# Patient Record
Sex: FEMALE | Race: WHITE | URBAN - METROPOLITAN AREA
[De-identification: names, ages, dates, MRNs, and addresses within clinical notes are randomized per-mention and may not be internally consistent; named-entity substitution may affect disease eponyms.]

---

## 2017-04-04 ENCOUNTER — TELEPHONE (OUTPATIENT)
Dept: OBGYN CLINIC | Age: 37
End: 2017-04-04

## 2017-04-04 RX ORDER — ETONOGESTREL AND ETHINYL ESTRADIOL 11.7; 2.7 MG/1; MG/1
INSERT, EXTENDED RELEASE VAGINAL
Qty: 3 DEVICE | Refills: 0 | Status: SHIPPED | OUTPATIENT
Start: 2017-04-04 | End: 2017-06-01 | Stop reason: SDUPTHER

## 2017-04-04 NOTE — TELEPHONE ENCOUNTER
31year old patient last seen in the office on 3/8/16. Patient had to be rescheduled for appointment now on 4/25/17. Prescription for birth control sent as per MD order to patient preferred pharmacy.

## 2017-06-01 ENCOUNTER — OFFICE VISIT (OUTPATIENT)
Dept: OBGYN CLINIC | Age: 37
End: 2017-06-01

## 2017-06-01 VITALS
BODY MASS INDEX: 23.21 KG/M2 | SYSTOLIC BLOOD PRESSURE: 114 MMHG | DIASTOLIC BLOOD PRESSURE: 74 MMHG | WEIGHT: 131 LBS | HEIGHT: 63 IN

## 2017-06-01 DIAGNOSIS — D06.9 CIN III (CERVICAL INTRAEPITHELIAL NEOPLASIA GRADE III) WITH SEVERE DYSPLASIA: ICD-10-CM

## 2017-06-01 DIAGNOSIS — Z01.419 ENCOUNTER FOR GYNECOLOGICAL EXAMINATION (GENERAL) (ROUTINE) WITHOUT ABNORMAL FINDINGS: Primary | ICD-10-CM

## 2017-06-01 DIAGNOSIS — Z87.42 HISTORY OF ABNORMAL CERVICAL PAPANICOLAOU SMEAR: ICD-10-CM

## 2017-06-01 RX ORDER — ETONOGESTREL AND ETHINYL ESTRADIOL 11.7; 2.7 MG/1; MG/1
INSERT, EXTENDED RELEASE VAGINAL
Qty: 3 DEVICE | Refills: 4 | Status: SHIPPED | OUTPATIENT
Start: 2017-06-01 | End: 2018-03-09 | Stop reason: SDUPTHER

## 2017-06-01 NOTE — MR AVS SNAPSHOT
Visit Information Date & Time Provider Department Dept. Phone Encounter #  
 6/1/2017  7:40 AM Haja Hernandez MD [facility] [id number] Upcoming Health Maintenance Date Due INFLUENZA AGE 9 TO ADULT 8/1/2017 PAP AKA CERVICAL CYTOLOGY 2/6/2019 Allergies as of 6/1/2017  Review Complete On: 6/1/2017 By: Jose Montero LPN No Known Allergies Current Immunizations  Never Reviewed No immunizations on file. Not reviewed this visit Vitals BP Height(growth percentile) Weight(growth percentile) LMP BMI OB Status 114/74 5' 3\" (1.6 m) 131 lb (59.4 kg) 05/20/2017 (Within Days) 23.21 kg/m2 Having regular periods Smoking Status Former Smoker BMI and BSA Data Body Mass Index Body Surface Area  
 23.21 kg/m 2 1.62 m 2 Preferred Pharmacy Pharmacy Name Phone [facility], [address] [address] [place] [address] [address] [facility] Your Updated Medication List  
  
   
This list is accurate as of: 6/1/17  8:16 AM.  Always use your most recent med list.  
  
  
  
  
 ethinyl estradiol-etonogestrel 0.12-0.015 mg/24 hr vaginal ring Commonly known as:  Waqas Vaca Insert vaginally, leave in for three weeks, remove for one week  
  
 lidocaine 2 % solution Commonly known as:  XYLOCAINE Take 15 mL by mouth as needed for Pain. penicillin v potassium 500 mg tablet Commonly known as:  VEETID  
  
 valACYclovir 1 gram tablet Commonly known as:  VALTREX Take 2 tablets by mouth two times daily for 1 day as  needed Patient Instructions Well Visit, Ages 25 to 48: Care Instructions Your Care Instructions Physical exams can help you stay healthy. Your doctor has checked your overall health and may have suggested ways to take good care of yourself. He or she also may have recommended tests.  At home, you can help prevent illness with healthy eating, regular exercise, and other steps. Follow-up care is a key part of your treatment and safety. Be sure to make and go to all appointments, and call your doctor if you are having problems. It's also a good idea to know your test results and keep a list of the medicines you take. How can you care for yourself at home? · Reach and stay at a healthy weight. This will lower your risk for many problems, such as obesity, diabetes, heart disease, and high blood pressure. · Get at least 30 minutes of physical activity on most days of the week. Walking is a good choice. You also may want to do other activities, such as running, swimming, cycling, or playing tennis or team sports. Discuss any changes in your exercise program with your doctor. · Do not smoke or allow others to smoke around you. If you need help quitting, talk to your doctor about stop-smoking programs and medicines. These can increase your chances of quitting for good. · Talk to your doctor about whether you have any risk factors for sexually transmitted infections (STIs). Having one sex partner (who does not have STIs and does not have sex with anyone else) is a good way to avoid these infections. · Use birth control if you do not want to have children at this time. Talk with your doctor about the choices available and what might be best for you. · Protect your skin from too much sun. When you're outdoors from 10 a.m. to 4 p.m., stay in the shade or cover up with clothing and a hat with a wide brim. Wear sunglasses that block UV rays. Even when it's cloudy, put broad-spectrum sunscreen (SPF 30 or higher) on any exposed skin. · See a dentist one or two times a year for checkups and to have your teeth cleaned. · Wear a seat belt in the car. · Drink alcohol in moderation, if at all. That means no more than 2 drinks a day for men and 1 drink a day for women. Follow your doctor's advice about when to have certain tests. These tests can spot problems early. For everyone · Cholesterol. Have the fat (cholesterol) in your blood tested after age 21. Your doctor will tell you how often to have this done based on your age, family history, or other things that can increase your risk for heart disease. · Blood pressure. Have your blood pressure checked during a routine doctor visit. Your doctor will tell you how often to check your blood pressure based on your age, your blood pressure results, and other factors. · Vision. Talk with your doctor about how often to have a glaucoma test. 
· Diabetes. Ask your doctor whether you should have tests for diabetes. · Colon cancer. Have a test for colon cancer at age 48. You may have one of several tests. If you are younger than 48, you may need a test earlier if you have any risk factors. Risk factors include whether you already had a precancerous polyp removed from your colon or whether your parent, brother, sister, or child has had colon cancer. For women · Breast exam and mammogram. Talk to your doctor about when you should have a clinical breast exam and a mammogram. Medical experts differ on whether and how often women under 50 should have these tests. Your doctor can help you decide what is right for you. · Pap test and pelvic exam. Begin Pap tests at age 24. A Pap test is the best way to find cervical cancer. The test often is part of a pelvic exam. Ask how often to have this test. 
· Tests for sexually transmitted infections (STIs). Ask whether you should have tests for STIs. You may be at risk if you have sex with more than one person, especially if your partners do not wear condoms. For men · Tests for sexually transmitted infections (STIs). Ask whether you should have tests for STIs. You may be at risk if you have sex with more than one person, especially if you do not wear a condom. · Testicular cancer exam. Ask your doctor whether you should check your testicles regularly. · Prostate exam. Talk to your doctor about whether you should have a blood test (called a PSA test) for prostate cancer. Experts differ on whether and when men should have this test. Some experts suggest it if you are older than 39 and are -American or have a father or brother who got prostate cancer when he was younger than 72. When should you call for help? Watch closely for changes in your health, and be sure to contact your doctor if you have any problems or symptoms that concern you. Where can you learn more? Go to http://hussein-arabella.info/. Enter P072 in the search box to learn more about \"Well Visit, Ages 25 to 48: Care Instructions. \" Current as of: July 19, 2016 Content Version: 11.2 © 9202-6994 [employer]. Care instructions adapted under license by [employer] (which disclaims liability or warranty for this information). If you have questions about a medical condition or this instruction, always ask your healthcare professional. [place] [address] any warranty or liability for your use of this information. Introducing [facility] & HEALTH SERVICES! Dear Jmaie Yancey: Thank you for requesting a Fashiont[employer] account. Our records indicate that you have previously registered for a Fashiont[employer] account but its currently inactive. Please call our Fashiont[employer] support line at [phone]. Additional Information If you have questions, please visit the Frequently Asked Questions section of the Fashion[employer] website at [email or web address]. [employer]. [employer]/. Remember, Fashion[employer] is NOT to be used for urgent needs. For medical emergencies, dial 911. Now available from your iPhone and Android! Please provide this summary of care documentation to your next provider. Your primary care clinician is listed as Rafat Burk.  If you have any questions after today's visit, please call [phone].

## 2017-06-01 NOTE — PATIENT INSTRUCTIONS

## 2017-06-01 NOTE — PROGRESS NOTES
[facility]  [email or web address]  [phone]    Efrain Dorsey MD, FACOG       Annual Gynecologic Exam:  WWE <40  Chief Complaint   Patient presents with    Well Woman         Enid Woodson is a 31 y.o.  WHITE OR  female who presents for an annual well woman exam.  Patient's last menstrual period was 2017 (within days). .    With regard to the Gardisil vaccine, she is older than the FDA approved age to receive it. She does not report additional concerns today. Menstrual status:  Her periods are moderate. She does not report dysmenorrhea/painful menses. She does not report irregular bleeding. Sexual history and Contraception:  History   Sexual Activity    Sexual activity: Yes    Partners: Male    Birth control/ protection: Inserts     Comment: Nuvaring     She sometimes use condoms with sexual activity  She does reports new sexual partner(s) in the last year. The patient does not request STD testing. We recommended testing per CDC guidelines and at patient request.     Preventive Medicine History:  Her last annual GYN exam was about one year ago. Her most recent Pap smear result: normal was obtained in 2015. Her most recent HR HPV screen was not obtained in , but was negative . She does have a history of PATRICIA 2, 3 or cervical cancer.      Past Medical History:   Diagnosis Date    PATRICIA III (cervical intraepithelial neoplasia grade III) with severe dysplasia     Depression     History of seasonal allergies     Hx of abnormal Pap smear 4/28/10    Oral herpes simplex infection     Other specified viral infection, in conditions classified elsewhere and of unspecified site     Pap smear for cervical cancer screening 4/28/10,14-LGSIL; - Negative, HPV negative    Pap smear for cervical cancer screening 3/5/15    normal     OB History    Para Term  AB SAB TAB Ectopic Multiple Living   2 2 2 0 0 0 0 0 0 2      # Outcome Date GA Lbr Neto/2nd Weight Sex Delivery Anes PTL Lv   2 Term            1 Term                 Past Surgical History:   Procedure Laterality Date    HX BREAST AUGMENTATION  2015    b/l    HX COLPOSCOPY  6/9/10    PATRICIA III    HX LEEP PROCEDURE  6/21/10    in office      Family History   Problem Relation Age of Onset    Thyroid Disease Mother     Hypertension Father     Diabetes Maternal Grandfather      Social History     Social History    Marital status: SINGLE     Spouse name: N/A    Number of children: N/A    Years of education: N/A     Occupational History    Not on file. Social History Main Topics    Smoking status: Former Smoker     Quit date: 7/5/2011    Smokeless tobacco: Never Used    Alcohol use No    Drug use: Not on file    Sexual activity: Yes     Partners: Male     Birth control/ protection: Inserts      Comment: Nuvaring     Other Topics Concern    Not on file     Social History Narrative       No Known Allergies    Current Outpatient Prescriptions   Medication Sig    ethinyl estradiol-etonogestrel (NUVARING) 0.12-0.015 mg/24 hr vaginal ring Insert vaginally, leave in for three weeks, remove for one week    valACYclovir (VALTREX) 1 gram tablet Take 2 tablets by mouth two times daily for 1 day as  needed     No current facility-administered medications for this visit.         Patient Active Problem List   Diagnosis Code    History of abnormal cervical Papanicolaou smear Z87.898    Other specified viral infection, in conditions classified elsewhere and of unspecified site B97.89    Vaginitis N76.0       Review of Systems - History obtained from the patient  Constitutional: negative for weight loss, fever, night sweats  HEENT: negative for hearing loss, earache, congestion, snoring, sorethroat  CV: negative for chest pain, palpitations, edema  Resp: negative for cough, shortness of breath, wheezing  GI: negative for change in bowel habits, abdominal pain, black or bloody stools  : negative for frequency, dysuria, hematuria  GYN: see HPI  MSK: negative for back pain, joint pain, muscle pain  Breast: negative for breast lumps, nipple discharge, galactorrhea  Skin :negative for itching, rash, hives  Neuro: negative for dizziness, headache, confusion, weakness  Psych: negative for anxiety, depression, change in mood  Heme/lymph: negative for bleeding, bruising, pallor    Physical Exam  Visit Vitals    /74    Ht 5' 3\" (1.6 m)    Wt 131 lb (59.4 kg)    LMP 05/20/2017 (Within Days)    BMI 23.21 kg/m2       Constitutional  · Appearance: well-nourished, well developed, alert, in no acute distress    HENT  · Head and Face: appears normal    Neck  · Inspection/Palpation: normal appearance, no masses or tenderness  · Lymph Nodes: no lymphadenopathy present  · Thyroid: gland size normal, nontender, no nodules or masses present on palpation    Chest  · Respiratory Effort: breathing labored  · Auscultation: normal breath sounds    Cardiovascular  · Heart:  · Auscultation: regular rate and rhythm without murmur    Breasts  · Inspection of Breasts: breasts symmetrical, no skin changes, no discharge present, nipple appearance normal, no skin retraction present  · Palpation of Breasts and Axillae: no masses present on palpation, no breast tenderness  · Axillary Lymph Nodes: no lymphadenopathy present    Gastrointestinal  · Abdominal Examination: abdomen non-tender to palpation, normal bowel sounds, no masses present  · Liver and spleen: no hepatomegaly present, spleen not palpable  · Hernias: no hernias identified    Genitourinary  · External Genitalia: normal appearance for age, no discharge present, no tenderness present, no inflammatory lesions present, no masses present  · Vagina: normal vaginal vault without central or paravaginal defects, no discharge present, no inflammatory lesions present, no masses present  · Bladder: non-tender to palpation  · Urethra: appears normal  · Cervix: normal   · Uterus: normal size, shape and consistency  · Adnexa: no adnexal tenderness present, no adnexal masses present  · Perineum: perineum within normal limits, no evidence of trauma, no rashes or skin lesions present  · Anus: anus within normal limits, no hemorrhoids present  · Inguinal Lymph Nodes: no lymphadenopathy present    Skin  · General Inspection: no rash, no lesions identified    Neurologic/Psychiatric  · Mental Status:  · Orientation: grossly oriented to person, place and time  · Mood and Affect: mood normal, affect appropriate    Assessment:  31 y.o.  for well woman exam  Encounter Diagnoses   Name Primary?  History of abnormal cervical Papanicolaou smear     Encounter for gynecological examination (general) (routine) without abnormal findings Yes    PATRICIA III (cervical intraepithelial neoplasia grade III) with severe dysplasia        Plan:  The patient was counseled about diet, exercise, healthy lifestyle  We discussed self breast exam  We discussed safer sex practices, condom use and risk factors for sexually transmitted diseases. We discussed current pap smear and HR HPV testing guidelines. We recommend follow up one year for routine annual gynecologic exam or sooner prn  We recommend routine follow up with her primary care doctor for management of chronic medical problems and non-gynecologic concerns  Handouts were given to the patient  We discussed calcium/vitamin D/weight bearing exercise and osteoporosis prevention  Discussed risks, benefits and alternatives of OCP: including but not limited to dvt/pe/mi/cva/ca/gi risks. We discussed progesterone only and non hormonal options for contraception including but not limited to condoms, IUDs, Nexplanon, and depo provera. Pt wants to continue nuvaring for now.     Folllow up:  [x] return for annual well woman exam in one year or sooner if she is having problems  [] follow up and ultrasound  [] 6 months  [] 3 months  [] 6 weeks   [] 1 month    Orders Placed This Encounter    ethinyl estradiol-etonogestrel (NUVARING) 0.12-0.015 mg/24 hr vaginal ring    PAP, IG, RFX HPV ASCUS ([id number])       No results found for any visits on 06/01/17.

## 2017-07-13 LAB
CYTOLOGIST CVX/VAG CYTO: NORMAL
CYTOLOGY CVX/VAG DOC THIN PREP: NORMAL
CYTOLOGY HISTORY:: NORMAL
DX ICD CODE: NORMAL
LABCORP, 190119: NORMAL
Lab: NORMAL
OTHER STN SPEC: NORMAL
PATH REPORT.FINAL DX SPEC: NORMAL
STAT OF ADQ CVX/VAG CYTO-IMP: NORMAL

## 2017-09-13 RX ORDER — VALACYCLOVIR HYDROCHLORIDE 1 G/1
TABLET, FILM COATED ORAL
Qty: 12 TAB | Refills: 0 | Status: SHIPPED | OUTPATIENT
Start: 2017-09-13 | End: 2018-03-09 | Stop reason: SDUPTHER

## 2017-09-13 NOTE — TELEPHONE ENCOUNTER
Patient last seen for AE on 06/01/17 and called today, left VM on triage line stating that she needs a refill of her valtrex. Please advise.

## 2017-09-13 NOTE — TELEPHONE ENCOUNTER
AE up to date. rx sent to pharmacy of choice. attempted to contact patient, phone services are not in service.

## 2017-09-15 ENCOUNTER — TELEPHONE (OUTPATIENT)
Dept: OBGYN CLINIC | Age: 37
End: 2017-09-15

## 2017-09-15 RX ORDER — ETONOGESTREL AND ETHINYL ESTRADIOL 11.7; 2.7 MG/1; MG/1
INSERT, EXTENDED RELEASE VAGINAL
Qty: 1 DEVICE | Refills: 0 | Status: SHIPPED | OUTPATIENT
Start: 2017-09-15 | End: 2018-06-12 | Stop reason: SDUPTHER

## 2017-09-15 NOTE — TELEPHONE ENCOUNTER
Message left at 3:16PM      30year old patient last seen in the office on 6/1/17 for AE. Prescription for birth control sent on 6/1/17 for patients mail order pharmacy . Patient calling to say that she is due to go out of town and needs an emergency prescription for one ring to be sent to her local pharmacy. Prescription sent as per MD order to patient preferred pharmacy. Patient advised to contact her mail order pharmacy when [place] from [place] the [place]. Patient verbalized understanding.

## 2017-09-28 ENCOUNTER — OFFICE VISIT (OUTPATIENT)
Dept: FAMILY MEDICINE CLINIC | Age: 37
End: 2017-09-28

## 2017-09-28 VITALS
HEIGHT: 63 IN | OXYGEN SATURATION: 99 % | RESPIRATION RATE: 16 BRPM | DIASTOLIC BLOOD PRESSURE: 90 MMHG | WEIGHT: 132 LBS | TEMPERATURE: 98.6 F | SYSTOLIC BLOOD PRESSURE: 132 MMHG | HEART RATE: 102 BPM | BODY MASS INDEX: 23.39 KG/M2

## 2017-09-28 DIAGNOSIS — J02.0 STREP PHARYNGITIS: Primary | ICD-10-CM

## 2017-09-28 LAB
S PYO AG THROAT QL: POSITIVE
VALID INTERNAL CONTROL?: YES

## 2017-09-28 RX ORDER — AMOXICILLIN 875 MG/1
875 TABLET, FILM COATED ORAL 2 TIMES DAILY
Qty: 20 TAB | Refills: 0 | Status: SHIPPED | OUTPATIENT
Start: 2017-09-28 | End: 2017-10-08

## 2017-09-28 NOTE — PROGRESS NOTES
Chief Complaint   Patient presents with    Sore Throat     Seeing spots on back of throat, glands feel swollen and started this am. Sinus stuff for the past couple of days

## 2017-09-28 NOTE — PROGRESS NOTES
Subjective:   Laya Pace is a 30 y.o. female who complains of sore throat and not feeling well for 2-3 days, gradually worsening since that time. Pt's daughter had strep 2 weeks ago. She just returned from a 10 day vacation to [place]. Started having drainage and sore throat a couple days ago, attributed to allergies. Sore throat has worsened and saw \"white spots\" on tonsils today. She denies a history of chills, fevers, shortness of breath, vomiting, wheezing and cough. Evaluation to date: none. Treatment to date: OTC products. Patient does not smoke cigarettes. Relevant PMH:   Past Medical History:   Diagnosis Date    PATRICIA III (cervical intraepithelial neoplasia grade III) with severe dysplasia     Depression     History of seasonal allergies     Hx of abnormal Pap smear 4/28/10    Oral herpes simplex infection     Other specified viral infection, in conditions classified elsewhere and of unspecified site     Pap smear for cervical cancer screening 4/28/10,2/6/14 2010-LGSIL;2/14 - Negative, HPV negative    Pap smear for cervical cancer screening 3/5/15    normal    Pap smear for cervical cancer screening 06/01/2017    Negative, no hpv done     Past Surgical History:   Procedure Laterality Date    HX BREAST AUGMENTATION  2015    b/l    HX COLPOSCOPY  6/9/10    PATRICIA III    HX LEEP PROCEDURE  6/21/10    in office      No Known Allergies      Review of Systems  Pertinent items are noted in HPI.     Objective:     Visit Vitals    BP (!) 128/92    Pulse (!) 102    Temp 98.6 °F (37 °C) (Oral)    Resp 16    Ht 5' 3\" (1.6 m)    Wt 132 lb (59.9 kg)    LMP 09/16/2017 (Approximate)    SpO2 99%    BMI 23.38 kg/m2     General:  alert, cooperative, no distress   Eyes: negative   Ears: normal TM's and external ear canals AU   Sinuses: Normal paranasal sinuses without tenderness   Mouth:  Lips, mucosa, and tongue normal. Teeth and gums normal and abnormal findings: moderate oropharyngeal erythema, tonsillar hypertrophy 1+ and exudates present   Neck: supple, symmetrical, trachea midline and mild anterior cervical adenopathy. Heart: S1 and S2 normal, no murmurs noted. Lungs: clear to auscultation bilaterally   Abdomen: soft, non-tender. Bowel sounds normal. No masses,  no organomegaly          Results for orders placed or performed in visit on 09/28/17   AMB POC RAPID STREP A   Result Value Ref Range    VALID INTERNAL CONTROL POC Yes     Group A Strep Ag Positive Negative       Assessment/Plan:       ICD-10-CM ICD-9-CM    1. Strep pharyngitis J02.0 034.0 AMB POC RAPID STREP A   -BP mildly elevated. Pt denies hx of HTN or elevated BP readings in past.  Repeat BP with manual cuff 132/90. Pt w/ acute illness and just returning to work from vac, likely due to this. Recommend repeat BP here in this office or in the hospital (works w/ [address] equipment), f/u with PCP if remains elevated. Suggested symptomatic OTC remedies. Antibiotics per orders. RTC prn. Eda Nurse, NP  This note will not be viewable in [address]

## 2017-09-28 NOTE — PATIENT INSTRUCTIONS

## 2017-09-28 NOTE — MR AVS SNAPSHOT
Visit Information Date & Time Provider Department Dept. Phone Encounter #  
 9/28/2017 11:00 AM Janine Kelly NP [address] [facility] [phone] [id number] Follow-up Instructions Return if symptoms worsen or fail to improve. Your Appointments 6/5/2018  7:50 AM  
ESTABLISHED PATIENT with MD Kishore Fraser ([address]) Appt Note: annual exam TP  
 [address] [facility] [address] [facility] [place] [id number]  
[facility] [address] Upcoming Health Maintenance Date Due DTaP/Tdap/Td series (1 - Tdap) 8/4/2003 INFLUENZA AGE 9 TO ADULT 8/1/2017 PAP AKA CERVICAL CYTOLOGY 6/1/2022 Allergies as of 9/28/2017  Review Complete On: 9/28/2017 By: Janine Kelly NP No Known Allergies Current Immunizations  Never Reviewed No immunizations on file. Not reviewed this visit You Were Diagnosed With   
  
 Codes Comments Strep pharyngitis    -  Primary ICD-10-CM: J02.0 ICD-9-CM: 034.0 Vitals BP Pulse Temp Resp Height(growth percentile) Weight(growth percentile) (!) 128/92 (!) 102 98.6 °F (37 °C) (Oral) 16 5' 3\" (1.6 m) 132 lb (59.9 kg) LMP SpO2 BMI OB Status Smoking Status 09/16/2017 (Approximate) 99% 23.38 kg/m2 Having regular periods Former Smoker Vitals History BMI and BSA Data Body Mass Index Body Surface Area  
 23.38 kg/m 2 1.63 m 2 Preferred Pharmacy Pharmacy Name Phone [facility] [phone] Your Updated Medication List  
  
   
This list is accurate as of: 9/28/17 11:25 AM.  Always use your most recent med list.  
  
  
  
  
 amoxicillin 875 mg tablet Commonly known as:  AMOXIL Take 1 Tab by mouth two (2) times a day for 10 days. * ethinyl estradiol-etonogestrel 0.12-0.015 mg/24 hr vaginal ring Commonly known as:  Chikis Flair  
 Insert vaginally, leave in for three weeks, remove for one week * ethinyl estradiol-etonogestrel 0.12-0.015 mg/24 hr vaginal ring Commonly known as:  Healyrandy Mclean Insert one ring vaginally, leave in for 3 weeks, remove for one week and repeat  
  
 valACYclovir 1 gram tablet Commonly known as:  VALTREX Take 2 tabs po BID for 1 day as needed. * Notice: This list has 2 medication(s) that are the same as other medications prescribed for you. Read the directions carefully, and ask your doctor or other care provider to review them with you. Prescriptions Sent to Pharmacy Refills  
 amoxicillin (AMOXIL) 875 mg tablet 0 Sig: Take 1 Tab by mouth two (2) times a day for 10 days. Class: Normal  
 Pharmacy: [place], [address]  #: [phone] Route: Oral  
  
We Performed the Following AMB POC RAPID STREP A [38987 CPT(R)] Follow-up Instructions Return if symptoms worsen or fail to improve. Patient Instructions Strep Throat: Care Instructions Your Care Instructions Strep throat is a bacterial infection that causes sudden, severe sore throat and fever. Strep throat, which is caused by bacteria called streptococcus, is treated with antibiotics. Sometimes a strep test is necessary to tell if the sore throat is caused by strep bacteria. Treatment can help ease symptoms and may prevent future problems. Follow-up care is a key part of your treatment and safety. Be sure to make and go to all appointments, and call your doctor if you are having problems. It's also a good idea to know your test results and keep a list of the medicines you take. How can you care for yourself at home? · Take your antibiotics as directed. Do not stop taking them just because you feel better. You need to take the full course of antibiotics.  
· Strep throat can spread to others until 24 hours after you begin taking antibiotics. During this time, you should avoid contact with other people at work or home, especially infants and children. Do not sneeze or cough on others, and wash your hands often. Keep your drinking glass and eating utensils separate from those of others, and wash these items well in hot, soapy water. · Gargle with warm salt water at least once each hour to help reduce swelling and make your throat feel better. Use 1 teaspoon of salt mixed in 8 fluid ounces of warm water. · Take an over-the-counter pain medication, such as acetaminophen (Tylenol), ibuprofen (Advil, Motrin), or naproxen (Aleve). Read and follow all instructions on the label. · Try an over-the-counter anesthetic throat spray or throat lozenges, which may help relieve throat pain. · Drink plenty of fluids. Fluids may help soothe an irritated throat. Hot fluids, such as tea or soup, may help your throat feel better. · Eat soft solids and drink plenty of clear liquids. Flavored ice pops, ice cream, scrambled eggs, sherbet, and gelatin dessert (such as Jell-O) may also soothe the throat. · Get lots of rest. 
· Do not smoke, and avoid secondhand smoke. If you need help quitting, talk to your doctor about stop-smoking programs and medicines. These can increase your chances of quitting for good. · Use a vaporizer or humidifier to add moisture to the air in your bedroom. Follow the directions for cleaning the machine. When should you call for help? Call your doctor now or seek immediate medical care if: 
· You have a new or higher fever. · You have a fever with a stiff neck or severe headache. · You have new or worse trouble swallowing. · Your sore throat gets much worse on one side. · Your pain becomes much worse on one side of your throat. Watch closely for changes in your health, and be sure to contact your doctor if: 
· You are not getting better after 2 days (48 hours). · You do not get better as expected. Where can you learn more? Go to http://hussein-arabella.info/. Enter K625 in the search box to learn more about \"Strep Throat: Care Instructions. \" Current as of: July 29, 2016 Content Version: 11.3 © 3209-3502 Lectorati. Care instructions adapted under license by [employer] (which disclaims liability or warranty for this information). If you have questions about a medical condition or this instruction, always ask your healthcare professional. [address] any warranty or liability for your use of this information. Introducing [facility] [place] [facility]! Dear Nicole Hanley: Thank you for requesting a [employer] account. Our records indicate that you have previously registered for a [employer] account but its currently inactive. Please call our [employer] support line at [phone]. Additional Information If you have questions, please visit the Frequently Asked Questions section of the [employer] website at [email or web address] Food [email or web address] [email or web address]/. Remember, [employer] is NOT to be used for urgent needs. For medical emergencies, dial 911. Now available from your iPhone and Android! Please provide this summary of care documentation to your next provider. Your primary care clinician is listed as Kita Ohs. If you have any questions after today's visit, please call [phone].

## 2018-03-09 ENCOUNTER — TELEPHONE (OUTPATIENT)
Dept: OBGYN CLINIC | Age: 38
End: 2018-03-09

## 2018-03-09 RX ORDER — ETONOGESTREL AND ETHINYL ESTRADIOL 11.7; 2.7 MG/1; MG/1
INSERT, EXTENDED RELEASE VAGINAL
Qty: 3 DEVICE | Refills: 1 | Status: SHIPPED | OUTPATIENT
Start: 2018-03-09 | End: 2018-05-25 | Stop reason: SDUPTHER

## 2018-03-09 RX ORDER — VALACYCLOVIR HYDROCHLORIDE 1 G/1
TABLET, FILM COATED ORAL
Qty: 12 TAB | Refills: 0 | Status: SHIPPED | OUTPATIENT
Start: 2018-03-09 | End: 2018-06-12 | Stop reason: SDUPTHER

## 2018-03-09 NOTE — TELEPHONE ENCOUNTER
Patient advised of MD recommendations and prescriptions sent as per MD order to patient preferred pharmacy. Patient verbalized understanding.

## 2018-03-09 NOTE — TELEPHONE ENCOUNTER
Message left at 8:38am      TP 30year old patient last seen in the office on 6/1/17. Patient left a message asking for refills on her medication Nurvaring and valtrex to be sent for 90 days to her new mail order pharmacy,    ? Ok to resent the medications in 90 day supply to get patient to 6/12/18 AE appointment.       Valtrex only taken for out breaks      Please advise

## 2018-05-25 ENCOUNTER — TELEPHONE (OUTPATIENT)
Dept: OBGYN CLINIC | Age: 38
End: 2018-05-25

## 2018-05-25 RX ORDER — ETONOGESTREL AND ETHINYL ESTRADIOL 11.7; 2.7 MG/1; MG/1
INSERT, EXTENDED RELEASE VAGINAL
Qty: 1 DEVICE | Refills: 0 | Status: SHIPPED | OUTPATIENT
Start: 2018-05-25 | End: 2018-06-12 | Stop reason: SDUPTHER

## 2018-05-25 NOTE — TELEPHONE ENCOUNTER
Message left at 9:01am      30year old patient last seen in the office on 6/1/17. Patient has appointment on 6/12/18. Patient left message requesting a refill on her Nuvaring to get her to her appointment. This nurse left a detailed message for the patient to call the office back.

## 2018-05-25 NOTE — TELEPHONE ENCOUNTER
Sent rx to [address] per request of patient to get her through until annual next week. RX confirmed receipt.

## 2018-06-12 ENCOUNTER — OFFICE VISIT (OUTPATIENT)
Dept: OBGYN CLINIC | Age: 38
End: 2018-06-12

## 2018-06-12 VITALS
WEIGHT: 125 LBS | HEIGHT: 63 IN | DIASTOLIC BLOOD PRESSURE: 72 MMHG | BODY MASS INDEX: 22.15 KG/M2 | SYSTOLIC BLOOD PRESSURE: 110 MMHG

## 2018-06-12 DIAGNOSIS — B00.2 ORAL HERPES: ICD-10-CM

## 2018-06-12 DIAGNOSIS — N94.6 DYSMENORRHEA: ICD-10-CM

## 2018-06-12 DIAGNOSIS — Z01.419 ENCOUNTER FOR GYNECOLOGICAL EXAMINATION (GENERAL) (ROUTINE) WITHOUT ABNORMAL FINDINGS: Primary | ICD-10-CM

## 2018-06-12 DIAGNOSIS — D06.9 CIN III (CERVICAL INTRAEPITHELIAL NEOPLASIA GRADE III) WITH SEVERE DYSPLASIA: ICD-10-CM

## 2018-06-12 DIAGNOSIS — Z11.51 SCREENING FOR HPV (HUMAN PAPILLOMAVIRUS): ICD-10-CM

## 2018-06-12 RX ORDER — VALACYCLOVIR HYDROCHLORIDE 1 G/1
TABLET, FILM COATED ORAL
Qty: 90 TAB | Refills: 0 | Status: SHIPPED | OUTPATIENT
Start: 2018-06-12

## 2018-06-12 RX ORDER — ETONOGESTREL AND ETHINYL ESTRADIOL 11.7; 2.7 MG/1; MG/1
INSERT, EXTENDED RELEASE VAGINAL
Qty: 3 DEVICE | Refills: 4 | Status: SHIPPED | OUTPATIENT
Start: 2018-06-12

## 2018-06-12 NOTE — MR AVS SNAPSHOT
[address] [facility] [address] [phone] Patient: Glenys Ford MRN: [id number] ORB:[birth year] Visit Information Date & Time Provider Department Dept. Phone Encounter #  
 6/12/2018  7:40 AM Renata Tiwari MD [facility] Del[facility]ad[facility] [phone] [id number] Upcoming Health Maintenance Date Due Influenza Age 5 to Adult 8/1/2018 PAP AKA CERVICAL CYTOLOGY 6/1/2022 Allergies as of 6/12/2018  Review Complete On: 6/12/2018 By: Luis E Arana LPN No Known Allergies Current Immunizations  Never Reviewed No immunizations on file. Not reviewed this visit Vitals BP Height(growth percentile) Weight(growth percentile) LMP BMI OB Status 110/72 5' 3\" (1.6 m) 125 lb (56.7 kg) 05/28/2018 22.14 kg/m2 Having regular periods Smoking Status Former Smoker BMI and BSA Data Body Mass Index Body Surface Area  
 22.14 kg/m 2 1.59 m 2 Preferred Pharmacy Pharmacy Name Phone [id number], [address] [facility]. [phone] Your Updated Medication List  
  
   
This list is accurate as of 6/12/18  8:48 AM.  Always use your most recent med list.  
  
  
  
  
 ethinyl estradiol-etonogestrel 0.12-0.015 mg/24 hr vaginal ring Commonly known as:  [place] Insert one ring vaginally, leave in for 3 weeks, remove for one week and repeat  
  
 valACYclovir 1 gram tablet Commonly known as:  VALTREX Take 2 tabs po BID for 1 day as needed. Patient Instructions Well Visit, Ages 25 to 48: Care Instructions Your Care Instructions Physical exams can help you stay healthy. Your doctor has checked your overall health and may have suggested ways to take good care of yourself. He or she also may have recommended tests.  At home, you can help prevent illness with healthy eating, regular exercise, and other steps. Follow-up care is a key part of your treatment and safety. Be sure to make and go to all appointments, and call your doctor if you are having problems. It's also a good idea to know your test results and keep a list of the medicines you take. How can you care for yourself at home? · Reach and stay at a healthy weight. This will lower your risk for many problems, such as obesity, diabetes, heart disease, and high blood pressure. · Get at least 30 minutes of physical activity on most days of the week. Walking is a good choice. You also may want to do other activities, such as running, swimming, cycling, or playing tennis or team sports. Discuss any changes in your exercise program with your doctor. · Do not smoke or allow others to smoke around you. If you need help quitting, talk to your doctor about stop-smoking programs and medicines. These can increase your chances of quitting for good. · Talk to your doctor about whether you have any risk factors for sexually transmitted infections (STIs). Having one sex partner (who does not have STIs and does not have sex with anyone else) is a good way to avoid these infections. · Use birth control if you do not want to have children at this time. Talk with your doctor about the choices available and what might be best for you. · Protect your skin from too much sun. When you're outdoors from 10 a.m. to 4 p.m., stay in the shade or cover up with clothing and a hat with a wide brim. Wear sunglasses that block UV rays. Even when it's cloudy, put broad-spectrum sunscreen (SPF 30 or higher) on any exposed skin. · See a dentist one or two times a year for checkups and to have your teeth cleaned. · Wear a seat belt in the car. · Drink alcohol in moderation, if at all. That means no more than 2 drinks a day for men and 1 drink a day for women. Follow your doctor's advice about when to have certain tests. These tests can spot problems early. For everyone · Cholesterol. Have the fat (cholesterol) in your blood tested after age 21. Your doctor will tell you how often to have this done based on your age, family history, or other things that can increase your risk for heart disease. · Blood pressure. Have your blood pressure checked during a routine doctor visit. Your doctor will tell you how often to check your blood pressure based on your age, your blood pressure results, and other factors. · Vision. Talk with your doctor about how often to have a glaucoma test. 
· Diabetes. Ask your doctor whether you should have tests for diabetes. · Colon cancer. Have a test for colon cancer at age 48. You may have one of several tests. If you are younger than 48, you may need a test earlier if you have any risk factors. Risk factors include whether you already had a precancerous polyp removed from your colon or whether your parent, brother, sister, or child has had colon cancer. For women · Breast exam and mammogram. Talk to your doctor about when you should have a clinical breast exam and a mammogram. Medical experts differ on whether and how often women under 50 should have these tests. Your doctor can help you decide what is right for you. · Pap test and pelvic exam. Begin Pap tests at age 24. A Pap test is the best way to find cervical cancer. The test often is part of a pelvic exam. Ask how often to have this test. 
· Tests for sexually transmitted infections (STIs). Ask whether you should have tests for STIs. You may be at risk if you have sex with more than one person, especially if your partners do not wear condoms. For men · Tests for sexually transmitted infections (STIs). Ask whether you should have tests for STIs. You may be at risk if you have sex with more than one person, especially if you do not wear a condom. · Testicular cancer exam. Ask your doctor whether you should check your testicles regularly. · Prostate exam. Talk to your doctor about whether you should have a blood test (called a PSA test) for prostate cancer. Experts differ on whether and when men should have this test. Some experts suggest it if you are older than 39 and are -American or have a father or brother who got prostate cancer when he was younger than 72. When should you call for help? Watch closely for changes in your health, and be sure to contact your doctor if you have any problems or symptoms that concern you. Where can you learn more? Go to http://hussein-arabella.info/. Enter P072 in the search box to learn more about \"Well Visit, Ages 25 to 48: Care Instructions. \" Current as of: May 12, 2017 Content Version: 11.4 © 4257-4984 Favor. Care instructions adapted under license by PerkHub (which disclaims liability or warranty for this information). If you have questions about a medical condition or this instruction, always ask your healthcare professional. [place] [address] any warranty or liability for your use of this information. Introducing [facility] [place] [facility] HE[facility]! Dear Luciana Davidson: Thank you for requesting a [employer] account. Our records indicate that you have previously registered for a [employer] account but its currently inactive. Please call our [employer] support line at [phone]. Additional Information If you have questions, please visit the Frequently Asked Questions section of the [employer] website at [email or web address] Remember, [employer] is NOT to be used for urgent needs. For medical emergencies, dial 911. Now available from your iPhone and Android! Please provide this summary of care documentation to your next provider. Your primary care clinician is listed as Ellen Han.  If you have any questions after today's visit, please call [phone].

## 2018-06-12 NOTE — PROGRESS NOTES
[facility]  [email or web address]  [phone]    Author MD Devin, FACOG       Annual Gynecologic Exam:  WWE <40  Chief Complaint   Patient presents with    Well Woman         Juani Cardenas is a 30 y.o.  [facility] female who presents for an annual well woman exam.  Patient's last menstrual period was 2018. Jeanna Gaxiola Had some more cramping when she was late putting in new ring. May move to [place], plans to leave job soon. With regard to the Gardisil vaccine, she is older than the FDA approved age to receive it. She does not report additional concerns today. Menstrual status:  Her periods are moderate. She does not report dysmenorrhea/painful menses, except when late with inserting ring x1. She does not report irregular bleeding. Sexual history and Contraception:  History   Sexual Activity    Sexual activity: Yes    Partners: Male    Birth control/ protection: Inserts     Comment: Nuvaring     She never use condoms with sexual activity  She does not reports new sexual partner(s) in the last year. The patient does not request STD testing. We recommended testing per CDC guidelines and at patient request.     Preventive Medicine History:  Her most recent Pap smear result: normal was obtained in 2017  Her most recent HR HPV screen was Negative obtained in   She does have a history of PATRICIA 2, 3 or cervical cancer.      Past Medical History:   Diagnosis Date    PATRICIA III (cervical intraepithelial neoplasia grade III) with severe dysplasia     Depression     History of seasonal allergies     Hx of abnormal Pap smear 4/28/10    Oral herpes simplex infection     Other specified viral infection, in conditions classified elsewhere and of unspecified site     Pap smear for cervical cancer screening 4/28/10,14-LGSIL; - Negative, HPV negative    Pap smear for cervical cancer screening 3/5/15    normal    Pap smear for cervical cancer screening 2017    Negative, no hpv done     OB History    Para Term  AB Living   2 2 2 0 0 2   SAB TAB Ectopic Molar Multiple Live Births   0 0 0  0       # Outcome Date GA Lbr Neto/2nd Weight Sex Delivery Anes PTL Lv   2 Term            1 Term                 Past Surgical History:   Procedure Laterality Date    HX BREAST AUGMENTATION      b/l    HX COLPOSCOPY  6/9/10    PATRICIA III    HX LEEP PROCEDURE  6/21/10    in office      Family History   Problem Relation Age of Onset    Thyroid Disease Mother     Hypertension Father     Diabetes Maternal Grandfather      Social History     Social History    Marital status: SINGLE     Spouse name: N/A    Number of children: N/A    Years of education: N/A     Occupational History    Not on file. Social History Main Topics    Smoking status: Former Smoker     Quit date: 2011    Smokeless tobacco: Never Used    Alcohol use No    Drug use: Not on file    Sexual activity: Yes     Partners: Male     Birth control/ protection: Inserts      Comment: Nuvaring     Other Topics Concern    Not on file     Social History Narrative       No Known Allergies    Current Outpatient Prescriptions   Medication Sig    valACYclovir (VALTREX) 1 gram tablet Take 2 tabs po BID for 1 day as needed.  ethinyl estradiol-etonogestrel (NUVARING) 0.12-0.015 mg/24 hr vaginal ring Insert one ring vaginally, leave in for 3 weeks, remove for one week and repeat     No current facility-administered medications for this visit.         Patient Active Problem List   Diagnosis Code    History of abnormal cervical Papanicolaou smear Z87.898    Other specified viral infection, in conditions classified elsewhere and of unspecified site B97.89    Vaginitis N76.0    Dysmenorrhea N94.6       Review of Systems - History obtained from the patient  Constitutional: negative for weight loss, fever, night sweats  HEENT: negative for hearing loss, earache, congestion, snoring, sorethroat  CV: negative for chest pain, palpitations, edema  Resp: negative for cough, shortness of breath, wheezing  GI: negative for change in bowel habits, abdominal pain, black or bloody stools  : negative for frequency, dysuria, hematuria  GYN: see HPI  MSK: negative for back pain, joint pain, muscle pain  Breast: negative for breast lumps, nipple discharge, galactorrhea  Skin :negative for itching, rash, hives  Neuro: negative for dizziness, headache, confusion, weakness  Psych: negative for anxiety, depression, change in mood  Heme/lymph: negative for bleeding, bruising, pallor    Physical Exam  Visit Vitals    /72    Ht 5' 3\" (1.6 m)    Wt 125 lb (56.7 kg)    LMP 05/28/2018    BMI 22.14 kg/m2       Constitutional  · Appearance: well-nourished, well developed, alert, in no acute distress    HENT  · Head and Face: appears normal    Neck  · Inspection/Palpation: normal appearance, no masses or tenderness  · Lymph Nodes: no lymphadenopathy present  · Thyroid: gland size normal, nontender, no nodules or masses present on palpation    Chest  · Respiratory Effort: breathing unlabored  · Auscultation: normal breath sounds    Cardiovascular  · Heart:  · Auscultation: regular rate and rhythm without murmur    Breasts  · Inspection of Breasts: breasts symmetrical, no skin changes, no discharge present, nipple appearance normal, no skin retraction present  · Palpation of Breasts and Axillae: no masses present on palpation, no breast tenderness  · Axillary Lymph Nodes: no lymphadenopathy present    Gastrointestinal  · Abdominal Examination: abdomen non-tender to palpation, normal bowel sounds, no masses present  · Liver and spleen: no hepatomegaly present, spleen not palpable  · Hernias: no hernias identified    Genitourinary  · External Genitalia: normal appearance for age, no discharge present, no tenderness present, no inflammatory lesions present, no masses present  · Vagina: normal vaginal vault without central or paravaginal defects, no discharge present, no inflammatory lesions present, no masses present, ring in place. · Bladder: non-tender to palpation  · Urethra: appears normal  · Cervix: normal   · Uterus: normal size, shape and consistency  · Adnexa: no adnexal tenderness present, no adnexal masses present  · Perineum: perineum within normal limits, no evidence of trauma, no rashes or skin lesions present  · Anus: anus within normal limits, no hemorrhoids present  · Inguinal Lymph Nodes: no lymphadenopathy present    Skin  · General Inspection: no rash, no lesions identified    Neurologic/Psychiatric  · Mental Status:  · Orientation: grossly oriented to person, place and time  · Mood and Affect: mood normal, affect appropriate    Assessment:  30 y.o.  for well woman exam  Encounter Diagnoses   Name Primary?  Encounter for gynecological examination (general) (routine) without abnormal findings Yes    Screening for HPV (human papillomavirus)     PATRICIA III (cervical intraepithelial neoplasia grade III) with severe dysplasia     Oral herpes     Dysmenorrhea        Plan:  The patient was counseled about diet, exercise, healthy lifestyle  We discussed self breast exam  We discussed safer sex practices, condom use and risk factors for sexually transmitted diseases. We discussed current pap smear and HR HPV testing guidelines. We recommend follow up one year for routine annual gynecologic exam or sooner prn  We recommend routine follow up with her primary care doctor for management of chronic medical problems and non-gynecologic concerns  Handouts were given to the patient  We discussed calcium/vitamin D/weight bearing exercise and osteoporosis prevention  Discussed risks, benefits and alternatives of HRT: including but not limited to dvt/pe/mi/cva/ca/gi risks. Discussed consistent ring use. Rec back up contraception if leaves ring out > 7d.     Folllow up:  [x] return for annual well woman exam in one year or sooner if she is having problems  [] follow up and ultrasound  [] 6 months  [] 3 months  [] 6 weeks   [] 1 month    Orders Placed This Encounter    valACYclovir (VALTREX) 1 gram tablet    ethinyl estradiol-etonogestrel (NUVARING) 0.12-0.015 mg/24 hr vaginal ring    PAP IG, HPV AND RFX HPV 95/02,80([id number])       No results found for any visits on 06/12/18.

## 2018-06-12 NOTE — PATIENT INSTRUCTIONS

## 2018-06-14 LAB
CYTOLOGIST CVX/VAG CYTO: ABNORMAL
CYTOLOGY CVX/VAG DOC THIN PREP: ABNORMAL
CYTOLOGY HISTORY:: ABNORMAL
DX ICD CODE: ABNORMAL
DX ICD CODE: ABNORMAL
HPV I/H RISK 1 DNA CVX QL PROBE+SIG AMP: POSITIVE
Lab: ABNORMAL
OTHER STN SPEC: ABNORMAL
PATH REPORT.FINAL DX SPEC: ABNORMAL
PATHOLOGIST CVX/VAG CYTO: ABNORMAL
STAT OF ADQ CVX/VAG CYTO-IMP: ABNORMAL

## 2018-06-15 NOTE — PROGRESS NOTES
Pt notified, and verbalized understanding. Pt placed on schedule for 6/28/18 at 2:50pm to discuss the results, and have colpo.    FS updated

## 2018-06-15 NOTE — PROGRESS NOTES
Pap smear abnormal, recommend colposcopy. Update FS/pap. Notify patient, tickle for follow-up. Premedicate with 600mg Ibuprofen, if no contraindication (for example: pregnancy/allergy).

## 2018-06-27 ENCOUNTER — TELEPHONE (OUTPATIENT)
Dept: OBGYN CLINIC | Age: 38
End: 2018-06-27

## 2018-06-27 NOTE — TELEPHONE ENCOUNTER
Patient is scheduled to have her colpo done tomorrow with TP for ASCUS and Positive HPV. She will be on day 4 of her cycle which she said is typically the last day and very light bleeding. She quite her job and her insurance will  at the end of the month.     Is she okay to still pursue the colpo on 18 at 2:50 pm?

## 2018-06-29 ENCOUNTER — OFFICE VISIT (OUTPATIENT)
Dept: OBGYN CLINIC | Age: 38
End: 2018-06-29

## 2018-06-29 ENCOUNTER — HOSPITAL ENCOUNTER (OUTPATIENT)
Dept: LAB | Age: 38
Discharge: HOME | End: 2018-06-29

## 2018-06-29 VITALS
HEIGHT: 63 IN | DIASTOLIC BLOOD PRESSURE: 72 MMHG | BODY MASS INDEX: 21.62 KG/M2 | SYSTOLIC BLOOD PRESSURE: 114 MMHG | WEIGHT: 122 LBS

## 2018-06-29 DIAGNOSIS — Z01.812 PRE-PROCEDURE LAB EXAM: ICD-10-CM

## 2018-06-29 DIAGNOSIS — R87.810 ASCUS WITH POSITIVE HIGH RISK HPV CERVICAL: Primary | ICD-10-CM

## 2018-06-29 DIAGNOSIS — R87.610 ASCUS WITH POSITIVE HIGH RISK HPV CERVICAL: Primary | ICD-10-CM

## 2018-06-29 LAB
HCG URINE, QL. (POC): NEGATIVE
VALID INTERNAL CONTROL?: YES

## 2018-06-29 NOTE — PROGRESS NOTES
[facility]  [email or web address]  [phone]    Carissa Akers MD, [address] [facility]       Ob/Gyn visit    Chief Complaint:   Chief Complaint   Patient presents with    Colposcopy       History of Present Illness: This is a new problem being evaluated by this provider. Price Sesay is a , 30 y.o. female [facility]   She presents for an appointment for a pap smear abnormality consisting of ASCUS, HPV positive in 2018. Cervical cancer risk assessment:  Number of lifetime sexual partners: unsure  Approximate age of initiation of sexual intercourse: 9th grade   Tobacco use history: socially, vapes  Current tobacco use: occ  HPV vaccine history: Yes  Sexually transmitted disease exposure: Yes, HPV    Currently taking PNV/folic acid: NO    LMP: Patient's last menstrual period was 2018.     PFSH:  Past Medical History:   Diagnosis Date    Abnormal Pap smear of cervix 2018    ASCUS, HR HPV+    PATRICIA III (cervical intraepithelial neoplasia grade III) with severe dysplasia     Depression     History of seasonal allergies     Hx of abnormal Pap smear 4/28/10    Oral herpes simplex infection     Other specified viral infection, in conditions classified elsewhere and of unspecified site     Pap smear for cervical cancer screening 4/28/10,14-LGSIL; - Negative, HPV negative    Pap smear for cervical cancer screening 3/5/15    normal    Pap smear for cervical cancer screening 2017    Negative, no hpv done     Past Surgical History:   Procedure Laterality Date    HX BREAST AUGMENTATION      b/l    HX COLPOSCOPY  6/9/10    PATRICIA III    HX LEEP PROCEDURE  6/21/10    in office      Family History   Problem Relation Age of Onset    Thyroid Disease Mother     Hypertension Father     Diabetes Maternal Grandfather      Social History     Social History    Marital status: SINGLE     Spouse name: N/A    Number of children: N/A    Years of education: N/A     Occupational History    Not on file. Social History Main Topics    Smoking status: Former Smoker     Quit date: 7/5/2011    Smokeless tobacco: Never Used    Alcohol use No    Drug use: Not on file    Sexual activity: Yes     Partners: Male     Birth control/ protection: Inserts      Comment: Nuvaring     Other Topics Concern    Not on file     Social History Narrative       No Known Allergies  Current Outpatient Prescriptions   Medication Sig    valACYclovir (VALTREX) 1 gram tablet Take 2 tabs po BID for 1 day as needed.  ethinyl estradiol-etonogestrel (NUVARING) 0.12-0.015 mg/24 hr vaginal ring Insert one ring vaginally, leave in for 3 weeks, remove for one week and repeat     No current facility-administered medications for this visit. Review of Systems:  History obtained from the patient  Constitutional: negative for fevers, chills and weight loss  ENT ROS: negative for - hearing change, oral lesions or visual changes  Respiratory: negative for cough, wheezing or dyspnea on exertion  Cardiovascular: negative for chest pain, irregular heart beats, exertional chest pressure/discomfort  Gastrointestinal: negative for dysphagia, nausea and vomiting  Genito-Urinary ROS: no dysuria, trouble voiding, or hematuria  Inteument/breast: negative for rash, breast lump and nipple discharge  Musculoskeletal:negative for stiff joints, neck pain and muscle weakness  Endocrine ROS: negative for - breast changes, galactorrhea or temperature intolerance  Hematological and Lymphatic ROS: negative for - blood clots, bruising or swollen lymph nodes    Physical Exam:  Visit Vitals    /72    Ht 5' 3\" (1.6 m)    Wt 122 lb (55.3 kg)    BMI 21.61 kg/m2       GENERAL: alert, well appearing, and in no distress  HEAD: normocephalic, atraumatic.    ABDOMEN: soft, nontender, nondistended, no masses or organomegaly   EGBUS: no lesions, no inflammation, no masses  VULVA: normal appearing vulva with no masses, tenderness or lesions  VAGINA: normal appearing vagina with normal color, no lesions, bloody discharge  CERVIX: normal appearing cervix without discharge or lesions, non tender  UTERUS: uterus is normal size, shape, consistency and nontender   ADNEXA: normal adnexa in size, nontender and no masses  NEURO: alert, oriented, normal speech    Assessment:  Encounter Diagnoses   Name Primary?  ASCUS with positive high risk HPV cervical Yes       Plan:  The patient is advised that she should contact the office if she does not note improvement or if symptoms recur  She should contact our office with any questions or concerns  She could keep her routine annual exam appointment. We reviewed her pap smear results and risk factors for cervical cancer. We discussed r/b/a of colposcopy and pt electec to proceed with procedure. After being presented with the risks, benefits and alternatives has she signed a consent for the procedure. She states that she understands the need for the procedure and has no further questions. She was informed that she may experience discomfort. Disc HPV vaccine  rec PNV/folic    Procedure Note: Colposcopy  Colposcopy procedure note:  Chart reviewed for the following:   Frances HOLT MD, have reviewed the History, Physical and updated the Allergic reactions for [address] performed immediately prior to start of procedure:   Frances HOLT MD, have performed the following reviews on [facility] prior to the start of the procedure:            * Patient was identified by name and date of birth   * Agreement on procedure being performed was verified  * Risks and Benefits explained to the patient  * Procedure site verified and marked as necessary  * Patient was positioned for comfort  * Consent was signed and verified  Time: 11:20 AM      Date of procedure: 6/29/2018    Procedure performed by:   Isaak Salter MD  Provider assisted by: Eboni Bob LPN  Patient assisted by: self  How tolerated by patient: tolerated the procedure well with no complications  Comments: none    She was positioned in the dorsal lithotomy position and a speculum was inserted into the vagina. Dilute acetic acid was applied to the cervix. The colposcope was used to visualize the cervix with white and green light. The transformation zone was completely visualized. This colposcopy was satisfactory. Findings:   The procedure was notable for white epithelium on the cervix. Biopsies were taken from the cervix . See accompanying image for biopsy sites. Monsels was applied to the cervix to obtain hemostasis. Endocervical currettage: An endocervical curettage was performed followed by a brush. Post Procedure Status: The patient tolerated the procedure well with minimal discomfort. She was observed for 10 minutes and released in good condition. She was given routine post-colposcopy instructions and handouts. She should notify us with any concerns, fevers or heavy bleeding. She was informed that she will be contacted with the pathology results and recommendation for follow-up.

## 2018-06-29 NOTE — MR AVS SNAPSHOT
[address] [facility] [address] [facility] 
[phone] Patient: Cornelio Arce MRN: [id number] Visit Information Date & Time Provider Department Dept. Phone Encounter #  
 6/29/2018 11:00 AM Guilherme Robledo MD [employer] [phone] [id number] Your Appointments 6/13/2019  7:40 AM  
ESTABLISHED PATIENT with Guilherme Robledo MD  
[employer] ([facility]) Appt Note: AE  
 [address] [facility] [address] [facility] [id number]  
[facility] [address] Upcoming Health Maintenance Date Due Influenza Age 5 to Adult 8/1/2018 PAP AKA CERVICAL CYTOLOGY 6/12/2023 Allergies as of 6/29/2018  Review Complete On: 6/29/2018 By: Jen Wren LPN No Known Allergies Current Immunizations  Never Reviewed No immunizations on file. Not reviewed this visit Vitals BP Height(growth percentile) Weight(growth percentile) LMP BMI OB Status 114/72 5' 3\" (1.6 m) 122 lb (55.3 kg) 06/25/2018 21.61 kg/m2 Having regular periods Smoking Status Former Smoker BMI and BSA Data Body Mass Index Body Surface Area  
 21.61 kg/m 2 1.57 m 2 Preferred Pharmacy Pharmacy Name Phone [id number] [address] [facility]. [phone] Your Updated Medication List  
  
   
This list is accurate as of 6/29/18 11:14 AM.  Always use your most recent med list.  
  
  
  
  
 ethinyl estradiol-etonogestrel 0.12-0.015 mg/24 hr vaginal ring Commonly known as:  Dom Bear Insert one ring vaginally, leave in for 3 weeks, remove for one week and repeat  
  
 valACYclovir 1 gram tablet Commonly known as:  VALTREX Take 2 tabs po BID for 1 day as needed. Patient Instructions Colposcopy: What to Expect at [facility] Your Recovery You may feel some soreness in your vagina for a day or two if you had a biopsy. Some vaginal bleeding or discharge is normal for up to a week after a biopsy. The discharge may be dark-colored if a solution was put on your cervix. You can use a sanitary pad for the bleeding. It may take a week or two for you to get the test results. This care sheet gives you a general idea about how long it will take for you to recover. But each person recovers at a different pace. Follow the steps below to feel better as quickly as possible. How can you care for yourself at home? Activity ? · You can return to work and most daily activities right after the test.  
Exercise ? · Do not exercise for 1 day after the test.  
Medicines ? · Your doctor will tell you if and when you can restart your medicines. He or she will also give you instructions about taking any new medicines. ? · If you take blood thinners, such as warfarin (Coumadin), clopidogrel (Plavix), or aspirin, be sure to talk to your doctor. He or she will tell you if and when to start taking those medicines again. Make sure that you understand exactly what your doctor wants you to do. ? · Take an over-the-counter pain medicine, such as acetaminophen (Tylenol), ibuprofen (Advil, Motrin), or naproxen (Aleve). Be safe with medicines. Read and follow all instructions on the label. Do not take two or more pain medicines at the same time unless the doctor told you to. Many pain medicines have acetaminophen, which is Tylenol. Too much acetaminophen (Tylenol) can be harmful. Other instructions ? · Use a pad if you have some bleeding. ? · Do not douche, have sexual intercourse, or use tampons for 1 week if you had a biopsy. This will allow time for your cervix to heal.  
? · You can take a bath or shower anytime after the test.  
Follow-up care is a key part of your treatment and safety.  Be sure to make and go to all appointments, and call your doctor if you are having problems. It's also a good idea to know your test results and keep a list of the medicines you take. When should you call for help? Call your doctor now or seek immediate medical care if: 
? · You have severe vaginal bleeding. This means that you are soaking through your usual pads or tampons each hour for 2 or more hours. ? · You have pain that does not get better after you take pain medicine. ? · You have signs of infection, such as: 
¨ Increased pain. ¨ Bad-smelling vaginal discharge. ¨ A fever. ? Watch closely for any changes in your health, and be sure to contact your doctor if: 
? · You have questions or concerns. Where can you learn more? Go to http://hussein-arabella.info/. Enter M523 in the search box to learn more about \"Colposcopy: What to Expect at Home. \" Current as of: May 12, 2017 Content Version: 11.4 © 6381-6596 [employer]. Care instructions adapted under license by [employer] (which disclaims liability or warranty for this information). If you have questions about a medical condition or this instruction, always ask your healthcare professional. [address] any warranty or liability for your use of this information. Introducing [facility]! Scott Lozoya introduces Vessix patient portal. Now you can access parts of your medical record, email your doctor's office, and request medication refills online. 1. In your internet browser, go to [email or web address] 2. Click on the First Time User? Click Here link in the Sign In box. You will see the New Member Sign Up page. 3. Enter your Vessix Access Code exactly as it appears below. You will not need to use this code after youve completed the sign-up process. If you do not sign up before the expiration date, you must request a new code. · Infor Access Code: [email or web address] Expires: 9/10/2018  9:07 AM 
 
4. Enter the last four digits of your Social Security Number (xxxx) and Date of Birth (mm/dd/yyyy) as indicated and click Submit. You will be taken to the next sign-up page. 5. Create a Infor ID. This will be your Infor login ID and cannot be changed, so think of one that is secure and easy to remember. 6. Create a Infor password. You can change your password at any time. 7. Enter your Password Reset Question and Answer. This can be used at a later time if you forget your password. 8. Enter your e-mail address. You will receive e-mail notification when new information is available in [address] 9. Click Sign Up. You can now view and download portions of your medical record. 10. Click the Download Summary menu link to download a portable copy of your medical information. If you have questions, please visit the Frequently Asked Questions section of the Infor website. Remember, Infor is NOT to be used for urgent needs. For medical emergencies, dial 911. Now available from your iPhone and Android! Please provide this summary of care documentation to your next provider. Your primary care clinician is listed as Norbert Correa. If you have any questions after today's visit, please call [phone].

## 2018-06-29 NOTE — PATIENT INSTRUCTIONS
Colposcopy: What to Expect at [address] [place] [address] may feel some soreness in your vagina for a day or two if you had a biopsy. Some vaginal bleeding or discharge is normal for up to a week after a biopsy. The discharge may be dark-colored if a solution was put on your cervix. You can use a sanitary pad for the bleeding. It may take a week or two for you to get the test results. This care sheet gives you a general idea about how long it will take for you to recover. But each person recovers at a different pace. Follow the steps below to feel better as quickly as possible. How can you care for yourself at home? Activity  ? · You can return to work and most daily activities right after the test.   Exercise  ? · Do not exercise for 1 day after the test.   Medicines  ? · Your doctor will tell you if and when you can restart your medicines. He or she will also give you instructions about taking any new medicines. ? · If you take blood thinners, such as warfarin (Coumadin), clopidogrel (Plavix), or aspirin, be sure to talk to your doctor. He or she will tell you if and when to start taking those medicines again. Make sure that you understand exactly what your doctor wants you to do. ? · Take an over-the-counter pain medicine, such as acetaminophen (Tylenol), ibuprofen (Advil, Motrin), or naproxen (Aleve). Be safe with medicines. Read and follow all instructions on the label. Do not take two or more pain medicines at the same time unless the doctor told you to. Many pain medicines have acetaminophen, which is Tylenol. Too much acetaminophen (Tylenol) can be harmful. Other instructions  ? · Use a pad if you have some bleeding. ? · Do not douche, have sexual intercourse, or use tampons for 1 week if you had a biopsy. This will allow time for your cervix to heal.   ? · You can take a bath or shower anytime after the test.   Follow-up care is a key part of your treatment and safety.  Be sure to make and go to all appointments, and call your doctor if you are having problems. It's also a good idea to know your test results and keep a list of the medicines you take. When should you call for help? Call your doctor now or seek immediate medical care if:  ? · You have severe vaginal bleeding. This means that you are soaking through your usual pads or tampons each hour for 2 or more hours. ? · You have pain that does not get better after you take pain medicine. ? · You have signs of infection, such as:  ¨ Increased pain. ¨ Bad-smelling vaginal discharge. ¨ A fever. ? Watch closely for any changes in your health, and be sure to contact your doctor if:  ? · You have questions or concerns. Where can you learn more? Go to http://hussein-arabella.info/. Enter M523 in the search box to learn more about \"Colposcopy: What to Expect at Home. \"  Current as of: May 12, 2017  Content Version: 11.4  © 2372-1640 [employer]. Care instructions adapted under license by [employer] (which disclaims liability or warranty for this information). If you have questions about a medical condition or this instruction, always ask your healthcare professional. [address] any warranty or liability for your use of this information.

## 2018-07-03 NOTE — PROGRESS NOTES
Pathology normal/benign. Please notify patient.   Update FS per pathology protocol 1/16  Endo neg ecto ele 1  Pap hpv 1 year , tickle

## 2018-07-09 ENCOUNTER — TELEPHONE (OUTPATIENT)
Dept: OBGYN CLINIC | Age: 38
End: 2018-07-09

## 2018-07-09 NOTE — TELEPHONE ENCOUNTER
Patient is calling for lab results from her surgical path on 6/29/18;        Notes Recorded by Tate Weinstein MD on 7/3/2018 at 6:28 AM  Pathology normal/benign. Please notify patient. Update FS per pathology protocol 1/16  Endo neg ecto ele 1  Pap hpv 1 year , tickle            Patient verbalized understanding.

## 2018-10-09 ENCOUNTER — TELEPHONE (OUTPATIENT)
Dept: OBGYN CLINIC | Age: 38
End: 2018-10-09

## 2018-10-09 NOTE — TELEPHONE ENCOUNTER
We can see her at any point, but usually recommend by 14years old. Even if it is just to meet and greet (keep clothes on).     Martha Wilson MD

## 2018-10-09 NOTE — TELEPHONE ENCOUNTER
Call received at 2:13PM      41year old patient last seen in the office on 6/12/2018. Mother wanting your opinion when she should have her 15year old daughter be seen by a GYN MD. Mother states her daughter has started her menses and currently is not sexually active but mother is possibly thinking birth control for prevention.       Please advise

## 2020-08-07 ENCOUNTER — TELEPHONE (OUTPATIENT)
Dept: OBGYN CLINIC | Age: 40
End: 2020-08-07

## 2020-08-07 NOTE — TELEPHONE ENCOUNTER
Called patient to schedule an annual exam for a repeat pap smear. However, patient has confirmed that she has moved out of state and is no longer coming to this practice.